# Patient Record
Sex: FEMALE | Race: OTHER
[De-identification: names, ages, dates, MRNs, and addresses within clinical notes are randomized per-mention and may not be internally consistent; named-entity substitution may affect disease eponyms.]

---

## 2019-03-15 ENCOUNTER — HOSPITAL ENCOUNTER (EMERGENCY)
Dept: HOSPITAL 54 - ER | Age: 19
LOS: 1 days | Discharge: HOME | End: 2019-03-16
Payer: MEDICAID

## 2019-03-15 VITALS — DIASTOLIC BLOOD PRESSURE: 72 MMHG | SYSTOLIC BLOOD PRESSURE: 122 MMHG

## 2019-03-15 VITALS — WEIGHT: 150 LBS | BODY MASS INDEX: 25.61 KG/M2 | HEIGHT: 64 IN

## 2019-03-15 DIAGNOSIS — R07.89: Primary | ICD-10-CM

## 2019-03-15 PROCEDURE — 71045 X-RAY EXAM CHEST 1 VIEW: CPT

## 2019-03-15 PROCEDURE — 93005 ELECTROCARDIOGRAM TRACING: CPT

## 2019-03-15 PROCEDURE — 99284 EMERGENCY DEPT VISIT MOD MDM: CPT

## 2019-03-15 PROCEDURE — 84703 CHORIONIC GONADOTROPIN ASSAY: CPT

## 2019-07-31 ENCOUNTER — HOSPITAL ENCOUNTER (EMERGENCY)
Dept: HOSPITAL 10 - E/R | Age: 19
LOS: 1 days | Discharge: HOME | End: 2019-08-01
Payer: COMMERCIAL

## 2019-07-31 VITALS
HEIGHT: 63 IN | WEIGHT: 133.38 LBS | HEIGHT: 63 IN | WEIGHT: 133.38 LBS | BODY MASS INDEX: 23.63 KG/M2 | BODY MASS INDEX: 23.63 KG/M2

## 2019-07-31 DIAGNOSIS — E11.65: Primary | ICD-10-CM

## 2019-07-31 DIAGNOSIS — R63.1: ICD-10-CM

## 2019-07-31 DIAGNOSIS — R35.8: ICD-10-CM

## 2019-07-31 PROCEDURE — 82803 BLOOD GASES ANY COMBINATION: CPT

## 2019-07-31 PROCEDURE — 81003 URINALYSIS AUTO W/O SCOPE: CPT

## 2019-07-31 PROCEDURE — 83036 HEMOGLOBIN GLYCOSYLATED A1C: CPT

## 2019-07-31 PROCEDURE — 80048 BASIC METABOLIC PNL TOTAL CA: CPT

## 2019-07-31 PROCEDURE — 81025 URINE PREGNANCY TEST: CPT

## 2019-07-31 PROCEDURE — 82962 GLUCOSE BLOOD TEST: CPT

## 2019-07-31 PROCEDURE — 36415 COLL VENOUS BLD VENIPUNCTURE: CPT

## 2019-07-31 PROCEDURE — 85025 COMPLETE CBC W/AUTO DIFF WBC: CPT

## 2019-07-31 PROCEDURE — 83735 ASSAY OF MAGNESIUM: CPT

## 2019-07-31 PROCEDURE — 96360 HYDRATION IV INFUSION INIT: CPT

## 2019-07-31 PROCEDURE — 84100 ASSAY OF PHOSPHORUS: CPT

## 2019-08-01 VITALS — HEART RATE: 80 BPM | RESPIRATION RATE: 16 BRPM | SYSTOLIC BLOOD PRESSURE: 118 MMHG | DIASTOLIC BLOOD PRESSURE: 83 MMHG

## 2019-08-01 NOTE — ERD
ER Documentation


Chief Complaint


Chief Complaint





told to come to er for high sugar, blood drawn 1 week ago, denies s/s





HPI


This is a 19-year-old female with no significant past medical history who is 


presenting to the emergency department for hyperglycemia.  The patient endorses 


polydipsia and polyuria, ongoing for several weeks.  She was evaluated by her 


primary care physician 2 weeks ago who instructed her to get laboratory tests 


done.  She completed these tests 1 week ago, and was called today and told to 


come to the emergency department for further assessment of her blood sugar.  


There are concerns that she could have diabetes.  The patient does also endorse 


intermittent abdominal cramping.





The patient denies fever or chills.  The patient has had no headache or vision 


changes.  The patient does not endorse neck or back pain. The patient denies 


lightheadedness or dizziness.  The patient has had no chest pain or trouble 


breathing.  The patient denies nausea or vomiting. The patient denies changes to


bowel movements or urination.  She denies constipation or diarrhea.  She denies 


black or bloody or tarry stools.  She denies dysuria or hematuria or urgency or 


frequency.  She denies any vaginal bleeding or burning or discharge or pain.  


The patient has had no focal deficits.  The patient has had no weakness or nu


mbness or tingling to the face or extremities.





ROS


All systems reviewed and are negative except as per history of present illness.





Allergies


Allergies:  


Coded Allergies:  


     No Known Drug Allergies (Verified  Allergy, Unknown, 7/31/19)





PMhx/Soc


Medical and Surgical Hx:  pt denies Medical Hx, pt denies Surgical Hx


History of Surgery:  No


Hx Neurological Disorder:  No


Hx Respiratory Disorders:  No


Hx Cardiac Disorders:  No


Hx Psychiatric Problems:  No


Hx Miscellaneous Medical Probl:  No


Hx Alcohol Use:  No


Hx Substance Use:  No


Hx Tobacco Use:  No


Smoking Status:  Never smoker





FmHx


Family History:  diabetes (In both of her grandmothers and her maternal aunt)





Physical Exam


Vitals





Vital Signs


  Date      Temp  Pulse  Resp  B/P (MAP)   Pulse Ox  O2          O2 Flow    FiO2


Time                                                 Delivery    Rate


    8/1/19           85    23      114/93       100  Room Air


     01:30                          (100)


    8/1/19           85    18      123/63       100  Room Air


     01:00                           (83)


    8/1/19           84    21      126/73        99  Room Air


     00:30                           (90)


   7/31/19  97.7    109    20      126/90        97


     23:08                          (102)





Physical Exam


Const:   No acute distress


Head:   Atraumatic 


Eyes:    Normal Conjunctiva


ENT:    Normal External Ears, Nose and Mouth.


Neck:                  Full range of motion. No meningismus.


Resp:   Clear to auscultation bilaterally


Cardio:   Regular rate and rhythm, no murmurs


Abd:    Soft, non tender, non distended. Normal bowel sounds


Skin:   No petechiae or rashes


Back:   No midline or flank tenderness


Ext:    No cyanosis, or edema


Neur:   Awake and alert


Psych:    Normal Mood and Affect


Result Diagram:  


8/1/19 0004                                                                     


          8/1/19 0004





Results 24 hrs





Laboratory Tests


Test
             7/31/19
23:12     8/1/19
00:04      8/1/19
00:05  8/1/19
00:25


Bedside Glucose      558 mg/dL


White Blood                         7.1 10^3/ul


Count


Red Blood Count                    4.68 10^6/ul


Hemoglobin                            12.5 g/dl


Hematocrit                               37.6 %


Mean Corpuscular                        80.3 fl


Volume


Mean Corpuscular                        26.7 pg


Hemoglobin


Mean Corpuscular  
                  33.2 g/dl 
  
                 



Hemoglobin
Yumi


nt


Red Cell                                 13.2 %


Distribution


Width


Platelet Count                      248 10^3/UL


Mean Platelet                           10.7 fl


Volume


Immature                                0.300 %


Granulocytes %


Neutrophils %                            50.6 %


Lymphocytes %                            40.4 %


Monocytes %                               6.7 %


Eosinophils %                             1.1 %


Basophils %                               0.9 %


Nucleated Red                       0.0 /100WBC


Blood Cells %


Immature                          0.020 10^3/ul


Granulocytes #


Neutrophils #                       3.6 10^3/ul


Lymphocytes #                       2.9 10^3/ul


Monocytes #                         0.5 10^3/ul


Eosinophils #                       0.1 10^3/ul


Basophils #                         0.1 10^3/ul


Nucleated Red                       0.0 10^3/ul


Blood Cells #


Urine Color                      STRAW


Urine Clarity                    CLEAR


Urine pH                                    6.0


Urine Specific                            1.033


Gravity


Urine Ketones                    NEGATIVE mg/dL


Urine Nitrite                    NEGATIVE mg/dL


Urine Bilirubin                  NEGATIVE mg/dL


Urine                            NEGATIVE mg/dL


Urobilinogen


Urine Leukocyte   
              NEGATIVE
Iliana/ul  
                 



Esterase



Urine Hemoglobin                 NEGATIVE mg/dL


Urine Glucose                          3+ mg/dL


Urine Total                      NEGATIVE mg/dl


Protein


Sodium Level                         135 mmol/L


Potassium Level                      3.9 mmol/L


Chloride Level                        98 mmol/L


Carbon Dioxide                        27 mmol/L


Level


Anion Gap                                    10


Blood Urea                             14 mg/dl


Nitrogen


Creatinine                           0.65 mg/dl


Est Glomerular    
              > 60 mL/min 
    
                 



Filtrat


Rate
mL/min


Glucose Level                         541 mg/dl


Calcium Level                         9.7 mg/dl


Phosphorus Level                      4.4 mg/dl


Magnesium Level                       1.7 mg/dl


Blood Gas                                         Blood venous


Specimen Source


Arterial Blood    
              
                8/1/2019
12:08:5  



Date Drawn
                                                   4 AM


Arterial Blood    
              
                VENOUS LINE 
     



Gas


Puncture
Site


Domingo Test                                        N/A


Venous Blood pH                                             7.364


Venous Blood      
              
                     46.3 mmHG 
  



pCO2


(Temp
Corrected)


Venous Blood pO2  
              
                     28.5 mmHG 
  



(Temp
Corrected)


Venous Blood                                          25.8 mmol/L


HCO3


Venous Blood                                            53.2 mmHG


Oxygen


Saturation


Venous Blood                                             0 mmol/L


Base Excess


Venous Blood                                            14.5 g/dl


Total Hemoglobin


Venous Blood                                               53.0 %


Oxyhemoglobin


Venous Blood                                                0.2 %


Methemoglobin


Carboxyhemoglobi                                            0.2 %


n


Blood Gas                                                  37.0 C


Temperature


Blood Gas Actual  
              
                            20 
  



Respiration
Rate


Blood Gas                                         ROOM AIR


Modality


FiO2                                                       21.0 %


Blood Gas                                         


Notified Whom


Blood Gas         
              
                8/1/2019
12:14:2  



Notified Time
                                                6 AM


POC Beta HCG,                                                       NEGATIVE


Qualitative


Test
              8/1/19
01:38  
                
                 



Bedside Glucose      362 mg/dL





Current Medications


 Medications
   Dose
          Sig/Rita
       Start Time
   Status  Last


 (Trade)       Ordered        Route
 PRN     Stop Time              Admin
Dose


                              Reason                                Admin


 Sodium         1,000 ml @ 
   Q1H ONCE
      8/1/19        DC            8/1/19


Chloride       1,000 mls/hr   IV
            00:00
 8/1/19                00:19



                                             00:59


 Sodium         1,000 ml @ 
   Q1H ONCE
      8/1/19        DC            8/1/19


Chloride       1,000 mls/hr   IV
            00:00
 8/1/19                00:30



                                             00:59








Procedures/MDM


MDM





The patient's presentation warrants further investigation. Previous medical 


records, if available, were reviewed.





LABS





The patient's laboratory testing was obtained and reviewed. No emergent 


treatment was required unless described below.





CBC:   No E/o systemic infection or severe anemia or thrombocytopenia


Chemistry:   No E/o severe acidosis or alkalosis or renal failure.  Hyperglycem


ia without diabetic ketoacidosis


Urine:   Glucosuria without ketonuria.  No infection or hematuria.


HbA1c:   Ordered and pending





TREATMENT/DISPOSITION





The patient presents for significantly elevated blood sugars.  Her blood sugar 


today was over 500.  The patient was given IV fluids with significant im


provement of her blood sugar down to the mid 300s.  There is no evidence of DKA.





I spoke with the on-call physician for [facility], Dr. Long, who provided 


instructions on an outpatient diabetes regimen to initiate.  The patient will be


given a prescription for metformin 500 mg tablets to be taken twice daily.





There was consideration for admission given that this is likely new onset 


diabetes.  However, the patient does have a good support system at home.  Dr. Long is also able to set up close follow-up with endocrinology for diabetic 


education and management.  After discussion with the on-call physician, I agree 


that this may be treated in an outpatient setting.





A hemoglobin A1c was ordered and is currently pending.





DISCHARGE





Upon reevaluation of the patient, symptoms have improved. No emergent diagnoses 


were identified. At this time, I feel that the patient stable for discharge.  


The patient was instructed to follow-up with a primary care physician in 1-3 


days.  The patient will be given strict precautions with which to return to the 


emergency department.





Prescriptions: Metformin





The patient's blood pressure was elevated at greater than 120/80 while in the 


emergency department.  The patient was otherwise stable with no evidence of 


hypertensive urgency or emergency.  The patient does not require admission for 


blood pressure control. I have discussed with the patient the risks of 


hypertension. I have instructed the patient to return to the ER for any new or 


worsening symptoms including chest pain, shortness of breath, headache, blurred 


vision, confusion, nausea, vomiting or LOC. I have advised the patient to follow


up with the primary care physician for outpatient monitoring and treatment for 


hypertension in 1-3 days.





DISCLAIMER





Inadvertent spelling and grammatical errors are likely due to EHR/dictation 


software use and do not reflect on the overall quality of patient care. Note 


that the electronic time recorded on this note does not necessarily reflect the 


actual time of the patient encounter.





Departure


Diagnosis:  


   Primary Impression:  


   Diabetes mellitus, new onset


   Additional Impressions:  


   Hyperglycemia


   Polydipsia


   Polyuria


Condition:  Stable


Patient Instructions:  DIABETES, General Info, Hyperglycemia (High Blood Sugar)


Referrals:  


HAM LONG M.D.


Please call the office if they have not called you by noon today.





Additional Instructions:  


Please call the clinic at noon if they have not already called you.  Need 


follow-up with an endocrinologist.





Please call your thank you for for coming to [facility] for 


your care today. Please ask your nurse or provider if you have questions about 


your care today and do not leave until all your questions have been answered.  


Please use any medications given as directed and follow-up with your doctor (or 


the doctor you were referred to) in the next 1-3 days. If you do not have a 


primary care doctor you may follow up at the [facility] or [facility]


(listed below). You may also use motrin and tylenol as needed for fever and/or 


pain unless instructed otherwise by your provider or nurse. Indications for more


urgent follow-up have been discussed, but you may return to the Emergency 


Department at ANY time for any worrisome or worsening symptoms.





If you have abdominal pain, please know that no test or exam you received is 


perfect and you should follow up within 8 hours for continued pain.





If you had any imaging studies today, such as an X-Ray or CT Scan, these studies


will be reviewed later by a radiologist. You will be called if there are 


important findings that were not identified today, so make sure the contact 


information you provided at registration is correct.





If you received any narcotic pain control medicine today, such as Vicodin, 


Morphine or Dilaudid, your coordination and judgment may be affected for a 


number of hours. Please do not drive or operate heavy machinery, and you may 


want someone to assist you at home. If you were given a prescription for 


narcotic medication, be aware that it is very addictive- use sparingly and only 


if necessary.





PLEASE SEEK FURTHER EVALUATION AND MANAGEMENT AT YOUR DOCTORS OFFICE WITHIN THE 


NEXT 1-3 DAYS. IT IS YOUR RESPONSIBILITY TO MAKE AN APPOINTMENT FOR FOLOW-UP 


CARE.





IF YOU HAVE A PRIMARY DOCTOR, PLEASE CALL THEIR OFFICE TO SCHEDULE AN 


APPOINTMENT FOR FOLLOW UP.





IF YOU DO NOT HAVE A PRIMARY DOCTOR YOU CAN CALL OUR PHYSICIAN REFERRAL HOTLINE 


AT [phone] 





IF YOU CAN NOT AFFORD TO SEE A PHYSICIAN YOU CAN CHOSE FROM THE FOLLOWING 


[facility] CLINICS:





[facility] [phone] [address] [facility] [phone] [address] [facility] [phone] [address] [facility] [phone] [address] [facility] [phone] [address] [facility]. [facility] [phone] [address] GRACE WHITT MD               Aug 1, 2019 02:14